# Patient Record
Sex: MALE | Race: OTHER | NOT HISPANIC OR LATINO | URBAN - METROPOLITAN AREA
[De-identification: names, ages, dates, MRNs, and addresses within clinical notes are randomized per-mention and may not be internally consistent; named-entity substitution may affect disease eponyms.]

---

## 2022-06-04 ENCOUNTER — EMERGENCY (EMERGENCY)
Facility: HOSPITAL | Age: 52
LOS: 1 days | Discharge: HOME | End: 2022-06-04
Attending: EMERGENCY MEDICINE
Payer: MEDICAID

## 2022-06-04 VITALS
WEIGHT: 180.78 LBS | OXYGEN SATURATION: 97 % | DIASTOLIC BLOOD PRESSURE: 81 MMHG | HEART RATE: 76 BPM | SYSTOLIC BLOOD PRESSURE: 113 MMHG | HEIGHT: 67 IN | TEMPERATURE: 98 F | RESPIRATION RATE: 18 BRPM

## 2022-06-04 VITALS
RESPIRATION RATE: 18 BRPM | TEMPERATURE: 99 F | DIASTOLIC BLOOD PRESSURE: 60 MMHG | OXYGEN SATURATION: 98 % | HEART RATE: 63 BPM | SYSTOLIC BLOOD PRESSURE: 130 MMHG

## 2022-06-04 DIAGNOSIS — Z98.890 OTHER SPECIFIED POSTPROCEDURAL STATES: Chronic | ICD-10-CM

## 2022-06-04 LAB
ALBUMIN SERPL ELPH-MCNC: 3 G/DL — LOW (ref 3.5–5)
ALP SERPL-CCNC: 63 U/L — SIGNIFICANT CHANGE UP (ref 40–120)
ALT FLD-CCNC: 30 U/L DA — SIGNIFICANT CHANGE UP (ref 10–60)
ANION GAP SERPL CALC-SCNC: 8 MMOL/L — SIGNIFICANT CHANGE UP (ref 5–17)
AST SERPL-CCNC: 16 U/L — SIGNIFICANT CHANGE UP (ref 10–40)
BASOPHILS # BLD AUTO: 0.07 K/UL — SIGNIFICANT CHANGE UP (ref 0–0.2)
BASOPHILS NFR BLD AUTO: 0.7 % — SIGNIFICANT CHANGE UP (ref 0–2)
BILIRUB SERPL-MCNC: 0.4 MG/DL — SIGNIFICANT CHANGE UP (ref 0.2–1.2)
BUN SERPL-MCNC: 13 MG/DL — SIGNIFICANT CHANGE UP (ref 7–18)
CALCIUM SERPL-MCNC: 8.4 MG/DL — SIGNIFICANT CHANGE UP (ref 8.4–10.5)
CHLORIDE SERPL-SCNC: 111 MMOL/L — HIGH (ref 96–108)
CK SERPL-CCNC: 133 U/L — SIGNIFICANT CHANGE UP (ref 35–232)
CO2 SERPL-SCNC: 26 MMOL/L — SIGNIFICANT CHANGE UP (ref 22–31)
CREAT SERPL-MCNC: 0.96 MG/DL — SIGNIFICANT CHANGE UP (ref 0.5–1.3)
EGFR: 95 ML/MIN/1.73M2 — SIGNIFICANT CHANGE UP
EOSINOPHIL # BLD AUTO: 0.28 K/UL — SIGNIFICANT CHANGE UP (ref 0–0.5)
EOSINOPHIL NFR BLD AUTO: 2.9 % — SIGNIFICANT CHANGE UP (ref 0–6)
GLUCOSE SERPL-MCNC: 146 MG/DL — HIGH (ref 70–99)
HCT VFR BLD CALC: 45 % — SIGNIFICANT CHANGE UP (ref 39–50)
HGB BLD-MCNC: 15.3 G/DL — SIGNIFICANT CHANGE UP (ref 13–17)
IMM GRANULOCYTES NFR BLD AUTO: 0.5 % — SIGNIFICANT CHANGE UP (ref 0–1.5)
LIDOCAIN IGE QN: 127 U/L — SIGNIFICANT CHANGE UP (ref 73–393)
LYMPHOCYTES # BLD AUTO: 4.29 K/UL — HIGH (ref 1–3.3)
LYMPHOCYTES # BLD AUTO: 44.1 % — HIGH (ref 13–44)
MAGNESIUM SERPL-MCNC: 2 MG/DL — SIGNIFICANT CHANGE UP (ref 1.6–2.6)
MCHC RBC-ENTMCNC: 31.6 PG — SIGNIFICANT CHANGE UP (ref 27–34)
MCHC RBC-ENTMCNC: 34 GM/DL — SIGNIFICANT CHANGE UP (ref 32–36)
MCV RBC AUTO: 93 FL — SIGNIFICANT CHANGE UP (ref 80–100)
MONOCYTES # BLD AUTO: 0.68 K/UL — SIGNIFICANT CHANGE UP (ref 0–0.9)
MONOCYTES NFR BLD AUTO: 7 % — SIGNIFICANT CHANGE UP (ref 2–14)
NEUTROPHILS # BLD AUTO: 4.35 K/UL — SIGNIFICANT CHANGE UP (ref 1.8–7.4)
NEUTROPHILS NFR BLD AUTO: 44.8 % — SIGNIFICANT CHANGE UP (ref 43–77)
NRBC # BLD: 0 /100 WBCS — SIGNIFICANT CHANGE UP (ref 0–0)
PLATELET # BLD AUTO: 270 K/UL — SIGNIFICANT CHANGE UP (ref 150–400)
POTASSIUM SERPL-MCNC: 4 MMOL/L — SIGNIFICANT CHANGE UP (ref 3.5–5.3)
POTASSIUM SERPL-SCNC: 4 MMOL/L — SIGNIFICANT CHANGE UP (ref 3.5–5.3)
PROT SERPL-MCNC: 6.7 G/DL — SIGNIFICANT CHANGE UP (ref 6–8.3)
RBC # BLD: 4.84 M/UL — SIGNIFICANT CHANGE UP (ref 4.2–5.8)
RBC # FLD: 13.2 % — SIGNIFICANT CHANGE UP (ref 10.3–14.5)
SODIUM SERPL-SCNC: 145 MMOL/L — SIGNIFICANT CHANGE UP (ref 135–145)
TROPONIN I, HIGH SENSITIVITY RESULT: 5.9 NG/L — SIGNIFICANT CHANGE UP
TROPONIN I, HIGH SENSITIVITY RESULT: 6 NG/L — SIGNIFICANT CHANGE UP
WBC # BLD: 9.72 K/UL — SIGNIFICANT CHANGE UP (ref 3.8–10.5)
WBC # FLD AUTO: 9.72 K/UL — SIGNIFICANT CHANGE UP (ref 3.8–10.5)

## 2022-06-04 PROCEDURE — 84484 ASSAY OF TROPONIN QUANT: CPT

## 2022-06-04 PROCEDURE — 99285 EMERGENCY DEPT VISIT HI MDM: CPT | Mod: 25

## 2022-06-04 PROCEDURE — 71045 X-RAY EXAM CHEST 1 VIEW: CPT | Mod: 26

## 2022-06-04 PROCEDURE — 93010 ELECTROCARDIOGRAM REPORT: CPT

## 2022-06-04 PROCEDURE — 99285 EMERGENCY DEPT VISIT HI MDM: CPT

## 2022-06-04 PROCEDURE — 83735 ASSAY OF MAGNESIUM: CPT

## 2022-06-04 PROCEDURE — 93005 ELECTROCARDIOGRAM TRACING: CPT

## 2022-06-04 PROCEDURE — 71045 X-RAY EXAM CHEST 1 VIEW: CPT

## 2022-06-04 PROCEDURE — 36415 COLL VENOUS BLD VENIPUNCTURE: CPT

## 2022-06-04 PROCEDURE — 83690 ASSAY OF LIPASE: CPT

## 2022-06-04 PROCEDURE — 85025 COMPLETE CBC W/AUTO DIFF WBC: CPT

## 2022-06-04 PROCEDURE — 82550 ASSAY OF CK (CPK): CPT

## 2022-06-04 PROCEDURE — 80053 COMPREHEN METABOLIC PANEL: CPT

## 2022-06-04 RX ORDER — ASPIRIN/CALCIUM CARB/MAGNESIUM 324 MG
162 TABLET ORAL ONCE
Refills: 0 | Status: COMPLETED | OUTPATIENT
Start: 2022-06-04 | End: 2022-06-04

## 2022-06-04 RX ADMIN — Medication 162 MILLIGRAM(S): at 13:20

## 2022-06-04 NOTE — ED PROVIDER NOTE - OBJECTIVE STATEMENT
52 y.o. male c/o MSCP radiated to Lt upper chest on & off for past few days, each episode lasted few mins., mild sob, palpitations, no dizziness, diaphoresis, sharp pain, 5/10,  No FH MI,

## 2022-06-04 NOTE — ED PROVIDER NOTE - PROGRESS NOTE DETAILS
Offer pt admission, pt declines.  2 sets trop-neg, will d/c home.  Pt with no CP @ this time.  Pt prefers outpt cardiology referral by his PMD

## 2022-06-04 NOTE — ED PROVIDER NOTE - PATIENT PORTAL LINK FT
You can access the FollowMyHealth Patient Portal offered by [facility] by registering at the following website: [email or web address]. By joining [facility]’s FollowMyHealth portal, you will also be able to view your health information using other applications (apps) compatible with our system.